# Patient Record
Sex: FEMALE | Race: ASIAN | NOT HISPANIC OR LATINO | Employment: STUDENT | URBAN - METROPOLITAN AREA
[De-identification: names, ages, dates, MRNs, and addresses within clinical notes are randomized per-mention and may not be internally consistent; named-entity substitution may affect disease eponyms.]

---

## 2021-09-20 ENCOUNTER — MEDICAL CORRESPONDENCE (OUTPATIENT)
Dept: HEALTH INFORMATION MANAGEMENT | Facility: CLINIC | Age: 31
End: 2021-09-20

## 2022-09-01 ENCOUNTER — LAB (OUTPATIENT)
Dept: LAB | Facility: CLINIC | Age: 32
End: 2022-09-01
Payer: MEDICAID

## 2022-09-01 DIAGNOSIS — Z02.89 REFUGEE HEALTH EXAMINATION: ICD-10-CM

## 2022-09-01 LAB
ALBUMIN SERPL BCG-MCNC: 4.3 G/DL (ref 3.5–5.2)
ALP SERPL-CCNC: 61 U/L (ref 35–104)
ALT SERPL W P-5'-P-CCNC: 28 U/L (ref 10–35)
ANION GAP SERPL CALCULATED.3IONS-SCNC: 14 MMOL/L (ref 7–15)
AST SERPL W P-5'-P-CCNC: 38 U/L (ref 10–35)
BASOPHILS # BLD AUTO: 0.1 10E3/UL (ref 0–0.2)
BASOPHILS NFR BLD AUTO: 2 %
BILIRUB SERPL-MCNC: 0.3 MG/DL
BUN SERPL-MCNC: 8.6 MG/DL (ref 6–20)
CALCIUM SERPL-MCNC: 9.7 MG/DL (ref 8.6–10)
CHLORIDE SERPL-SCNC: 102 MMOL/L (ref 98–107)
CHOLEST SERPL-MCNC: 195 MG/DL
CREAT SERPL-MCNC: 0.71 MG/DL (ref 0.51–0.95)
DEPRECATED HCO3 PLAS-SCNC: 21 MMOL/L (ref 22–29)
EOSINOPHIL # BLD AUTO: 0.6 10E3/UL (ref 0–0.7)
EOSINOPHIL NFR BLD AUTO: 6 %
ERYTHROCYTE [DISTWIDTH] IN BLOOD BY AUTOMATED COUNT: 12.1 % (ref 10–15)
GFR SERPL CREATININE-BSD FRML MDRD: >90 ML/MIN/1.73M2
GLUCOSE SERPL-MCNC: 77 MG/DL (ref 70–99)
HAV IGG SER QL IA: REACTIVE
HBV CORE AB SERPL QL IA: NONREACTIVE
HBV SURFACE AB SERPL IA-ACNC: 164.47 M[IU]/ML
HBV SURFACE AB SERPL IA-ACNC: REACTIVE M[IU]/ML
HBV SURFACE AG SERPL QL IA: NONREACTIVE
HCT VFR BLD AUTO: 42.3 % (ref 35–47)
HDLC SERPL-MCNC: 54 MG/DL
HGB BLD-MCNC: 14.1 G/DL (ref 11.7–15.7)
IMM GRANULOCYTES # BLD: 0 10E3/UL
IMM GRANULOCYTES NFR BLD: 0 %
LDLC SERPL CALC-MCNC: 127 MG/DL
LYMPHOCYTES # BLD AUTO: 3.5 10E3/UL (ref 0.8–5.3)
LYMPHOCYTES NFR BLD AUTO: 39 %
MCH RBC QN AUTO: 30.4 PG (ref 26.5–33)
MCHC RBC AUTO-ENTMCNC: 33.3 G/DL (ref 31.5–36.5)
MCV RBC AUTO: 91 FL (ref 78–100)
MONOCYTES # BLD AUTO: 0.7 10E3/UL (ref 0–1.3)
MONOCYTES NFR BLD AUTO: 8 %
NEUTROPHILS # BLD AUTO: 4 10E3/UL (ref 1.6–8.3)
NEUTROPHILS NFR BLD AUTO: 45 %
NONHDLC SERPL-MCNC: 141 MG/DL
NRBC # BLD AUTO: 0 10E3/UL
NRBC BLD AUTO-RTO: 0 /100
PLATELET # BLD AUTO: 243 10E3/UL (ref 150–450)
POTASSIUM SERPL-SCNC: 4.1 MMOL/L (ref 3.4–5.3)
PROT SERPL-MCNC: 8 G/DL (ref 6.4–8.3)
RBC # BLD AUTO: 4.64 10E6/UL (ref 3.8–5.2)
SODIUM SERPL-SCNC: 137 MMOL/L (ref 136–145)
T PALLIDUM AB SER QL: NONREACTIVE
TRIGL SERPL-MCNC: 68 MG/DL
WBC # BLD AUTO: 8.9 10E3/UL (ref 4–11)

## 2022-09-01 PROCEDURE — 86704 HEP B CORE ANTIBODY TOTAL: CPT

## 2022-09-01 PROCEDURE — 85025 COMPLETE CBC W/AUTO DIFF WBC: CPT

## 2022-09-01 PROCEDURE — 99000 SPECIMEN HANDLING OFFICE-LAB: CPT

## 2022-09-01 PROCEDURE — 87389 HIV-1 AG W/HIV-1&-2 AB AG IA: CPT

## 2022-09-01 PROCEDURE — 86787 VARICELLA-ZOSTER ANTIBODY: CPT

## 2022-09-01 PROCEDURE — 86708 HEPATITIS A ANTIBODY: CPT

## 2022-09-01 PROCEDURE — 36415 COLL VENOUS BLD VENIPUNCTURE: CPT

## 2022-09-01 PROCEDURE — 80061 LIPID PANEL: CPT

## 2022-09-01 PROCEDURE — 86682 HELMINTH ANTIBODY: CPT | Mod: 90

## 2022-09-01 PROCEDURE — 80053 COMPREHEN METABOLIC PANEL: CPT

## 2022-09-01 PROCEDURE — 87340 HEPATITIS B SURFACE AG IA: CPT

## 2022-09-01 PROCEDURE — 86803 HEPATITIS C AB TEST: CPT

## 2022-09-01 PROCEDURE — 86780 TREPONEMA PALLIDUM: CPT

## 2022-09-01 PROCEDURE — 86706 HEP B SURFACE ANTIBODY: CPT

## 2022-09-01 PROCEDURE — 86481 TB AG RESPONSE T-CELL SUSP: CPT

## 2022-09-02 LAB
HCV AB SERPL QL IA: NONREACTIVE
HIV 1+2 AB+HIV1 P24 AG SERPL QL IA: NONREACTIVE
VZV IGG SER QL IA: 879.8 INDEX
VZV IGG SER QL IA: POSITIVE

## 2022-09-03 LAB
GAMMA INTERFERON BACKGROUND BLD IA-ACNC: 0.19 IU/ML
M TB IFN-G BLD-IMP: NEGATIVE
M TB IFN-G CD4+ BCKGRND COR BLD-ACNC: 4.38 IU/ML
MITOGEN IGNF BCKGRD COR BLD-ACNC: -0.06 IU/ML
MITOGEN IGNF BCKGRD COR BLD-ACNC: -0.06 IU/ML
QUANTIFERON MITOGEN: 4.57 IU/ML
QUANTIFERON NIL TUBE: 0.19 IU/ML
QUANTIFERON TB1 TUBE: 0.13 IU/ML
QUANTIFERON TB2 TUBE: 0.13
STRONGYLOIDES IGG SER IA-ACNC: 0.5 IV

## 2022-09-07 ENCOUNTER — OFFICE VISIT (OUTPATIENT)
Dept: FAMILY MEDICINE | Facility: CLINIC | Age: 32
End: 2022-09-07
Payer: MEDICAID

## 2022-09-07 VITALS
HEART RATE: 81 BPM | WEIGHT: 118.5 LBS | BODY MASS INDEX: 23.26 KG/M2 | HEIGHT: 60 IN | DIASTOLIC BLOOD PRESSURE: 60 MMHG | TEMPERATURE: 98.1 F | SYSTOLIC BLOOD PRESSURE: 110 MMHG | RESPIRATION RATE: 16 BRPM | OXYGEN SATURATION: 99 %

## 2022-09-07 DIAGNOSIS — F34.1 DYSTHYMIA: ICD-10-CM

## 2022-09-07 DIAGNOSIS — I10 ESSENTIAL HYPERTENSION: ICD-10-CM

## 2022-09-07 DIAGNOSIS — Z02.89 REFUGEE HEALTH EXAMINATION: Primary | ICD-10-CM

## 2022-09-07 DIAGNOSIS — G44.209 TENSION HEADACHE: ICD-10-CM

## 2022-09-07 DIAGNOSIS — Z87.59 H/O ECLAMPSIA: ICD-10-CM

## 2022-09-07 DIAGNOSIS — B65.9 SCHISTOSOMIASIS: ICD-10-CM

## 2022-09-07 DIAGNOSIS — Z30.40 SURVEILLANCE OF CONTRACEPTIVE IMPLANT: ICD-10-CM

## 2022-09-07 DIAGNOSIS — F10.20 ALCOHOLISM (H): ICD-10-CM

## 2022-09-07 LAB — SCHISTOSOMA IGG SER IA-ACNC: 12 U

## 2022-09-07 PROCEDURE — 99417 PROLNG OP E/M EACH 15 MIN: CPT | Performed by: FAMILY MEDICINE

## 2022-09-07 PROCEDURE — 90686 IIV4 VACC NO PRSV 0.5 ML IM: CPT | Performed by: FAMILY MEDICINE

## 2022-09-07 PROCEDURE — 0052A COVID-19,PF,PFIZER (12+ YRS): CPT | Performed by: FAMILY MEDICINE

## 2022-09-07 PROCEDURE — 90472 IMMUNIZATION ADMIN EACH ADD: CPT | Performed by: FAMILY MEDICINE

## 2022-09-07 PROCEDURE — 90471 IMMUNIZATION ADMIN: CPT | Performed by: FAMILY MEDICINE

## 2022-09-07 PROCEDURE — 90715 TDAP VACCINE 7 YRS/> IM: CPT | Performed by: FAMILY MEDICINE

## 2022-09-07 PROCEDURE — 91305 COVID-19,PF,PFIZER (12+ YRS): CPT | Performed by: FAMILY MEDICINE

## 2022-09-07 PROCEDURE — 99205 OFFICE O/P NEW HI 60 MIN: CPT | Mod: 25 | Performed by: FAMILY MEDICINE
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
## 2022-09-08 PROBLEM — F34.1 DYSTHYMIA: Status: ACTIVE | Noted: 2022-09-08

## 2022-09-08 RX ORDER — ACETAMINOPHEN 500 MG
500-1000 TABLET ORAL EVERY 6 HOURS PRN
Qty: 50 TABLET | Refills: 4 | Status: SHIPPED | OUTPATIENT
Start: 2022-09-08 | End: 2023-11-01

## 2022-09-08 RX ORDER — PRAZIQUANTEL 600 MG/1
1200 TABLET, FILM COATED ORAL 2 TIMES DAILY
Qty: 4 TABLET | Refills: 0 | Status: SHIPPED | OUTPATIENT
Start: 2022-09-08 | End: 2022-09-21

## 2022-09-21 ENCOUNTER — TELEPHONE (OUTPATIENT)
Dept: FAMILY MEDICINE | Facility: CLINIC | Age: 32
End: 2022-09-21

## 2022-09-21 DIAGNOSIS — Z76.0 ENCOUNTER FOR MEDICATION REFILL: Primary | ICD-10-CM

## 2022-09-21 DIAGNOSIS — B65.9 SCHISTOSOMIASIS: ICD-10-CM

## 2022-09-21 NOTE — TELEPHONE ENCOUNTER
Medication Question or Refill    Contacts       Type Contact Phone/Fax    09/21/2022 09:50 AM CDT Phone (Incoming) Chris  (Other) [phone]          What medication are you calling about (include dose and sig)?: praziquantel (BILTRICIDE) 600 MG tablet    Controlled Substance Agreement on file:   CSA -- Patient Level:    CSA: None found at the patient level.       Who prescribed the medication?: Dr. Schafer    Do you need a refill? Yes: [facility] states never receive prescription. E-Prescribing Status: Receipt confirmed by pharmacy (9/8/2022  8:22 PM CDT).    When did you use the medication last? N/A    Patient offered an appointment? No    Do you have any questions or concerns?  Yes: Chris, the  call to report of [facility] stated [facility] did not receive patient prescription. Please resend prescription to [facility] - [place], [place] - [address] [facility]. Please contact the patient to let patient know when prescription is resend to pharmacy.    Preferred Pharmacy:     [facility] - [place], [place] - [address].  [address] [place] [address].  [place] [address]  Phone: [phone] Fax: [phone]      Okay to leave a detailed message?: Yes at Cell number on file:    Telephone Information:   Mobile [phone]

## 2022-09-21 NOTE — TELEPHONE ENCOUNTER
Reviewed and [facility] MD please sign, if this re-order is not successful, MA will contact pharmacy to phone in drug order. Thanks!

## 2022-09-22 RX ORDER — PRAZIQUANTEL 600 MG/1
1200 TABLET, FILM COATED ORAL 2 TIMES DAILY
Qty: 4 TABLET | Refills: 0 | Status: SHIPPED | OUTPATIENT
Start: 2022-09-22 | End: 2023-05-02

## 2022-09-22 NOTE — TELEPHONE ENCOUNTER
I signed the script for the medication, but changed the pharmacy to [facility] as instructed.

## 2023-02-01 ENCOUNTER — OFFICE VISIT (OUTPATIENT)
Dept: FAMILY MEDICINE | Facility: CLINIC | Age: 33
End: 2023-02-01
Payer: COMMERCIAL

## 2023-02-01 VITALS
HEART RATE: 95 BPM | BODY MASS INDEX: 25.52 KG/M2 | DIASTOLIC BLOOD PRESSURE: 82 MMHG | HEIGHT: 60 IN | OXYGEN SATURATION: 99 % | TEMPERATURE: 98 F | WEIGHT: 130 LBS | RESPIRATION RATE: 16 BRPM | SYSTOLIC BLOOD PRESSURE: 136 MMHG

## 2023-02-01 DIAGNOSIS — Z23 NEED FOR VACCINATION: ICD-10-CM

## 2023-02-01 DIAGNOSIS — Z12.4 CERVICAL CANCER SCREENING: Primary | ICD-10-CM

## 2023-02-01 DIAGNOSIS — F10.20 ALCOHOLISM (H): ICD-10-CM

## 2023-02-01 LAB
CLUE CELLS: NORMAL
TRICHOMONAS, WET PREP: NORMAL
WBC'S/HIGH POWER FIELD, WET PREP: NORMAL
YEAST, WET PREP: NORMAL

## 2023-02-01 PROCEDURE — 87624 HPV HI-RISK TYP POOLED RSLT: CPT | Performed by: FAMILY MEDICINE

## 2023-02-01 PROCEDURE — 87491 CHLMYD TRACH DNA AMP PROBE: CPT | Performed by: FAMILY MEDICINE

## 2023-02-01 PROCEDURE — 87210 SMEAR WET MOUNT SALINE/INK: CPT | Performed by: FAMILY MEDICINE

## 2023-02-01 PROCEDURE — 99215 OFFICE O/P EST HI 40 MIN: CPT | Performed by: FAMILY MEDICINE

## 2023-02-01 PROCEDURE — G0145 SCR C/V CYTO,THINLAYER,RESCR: HCPCS | Performed by: FAMILY MEDICINE

## 2023-02-01 PROCEDURE — 87591 N.GONORRHOEAE DNA AMP PROB: CPT | Performed by: FAMILY MEDICINE

## 2023-02-01 ASSESSMENT — PATIENT HEALTH QUESTIONNAIRE - PHQ9
10. IF YOU CHECKED OFF ANY PROBLEMS, HOW DIFFICULT HAVE THESE PROBLEMS MADE IT FOR YOU TO DO YOUR WORK, TAKE CARE OF THINGS AT HOME, OR GET ALONG WITH OTHER PEOPLE: NOT DIFFICULT AT ALL
SUM OF ALL RESPONSES TO PHQ QUESTIONS 1-9: 0
SUM OF ALL RESPONSES TO PHQ QUESTIONS 1-9: 0
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
## 2023-02-02 PROBLEM — I10 ESSENTIAL (PRIMARY) HYPERTENSION: Status: ACTIVE | Noted: 2022-09-07

## 2023-02-02 LAB
C TRACH DNA SPEC QL NAA+PROBE: NEGATIVE
N GONORRHOEA DNA SPEC QL NAA+PROBE: NEGATIVE

## 2023-02-03 LAB
BKR LAB AP GYN ADEQUACY: NORMAL
BKR LAB AP GYN INTERPRETATION: NORMAL
BKR LAB AP HPV REFLEX: NORMAL
BKR LAB AP PREVIOUS ABNORMAL: NORMAL
PATH REPORT.COMMENTS IMP SPEC: NORMAL
PATH REPORT.COMMENTS IMP SPEC: NORMAL
PATH REPORT.RELEVANT HX SPEC: NORMAL

## 2023-02-07 LAB
HUMAN PAPILLOMA VIRUS 16 DNA: NEGATIVE
HUMAN PAPILLOMA VIRUS 18 DNA: NEGATIVE
HUMAN PAPILLOMA VIRUS FINAL DIAGNOSIS: NORMAL
HUMAN PAPILLOMA VIRUS OTHER HR: NEGATIVE

## 2023-05-02 ENCOUNTER — OFFICE VISIT (OUTPATIENT)
Dept: FAMILY MEDICINE | Facility: CLINIC | Age: 33
End: 2023-05-02
Payer: COMMERCIAL

## 2023-05-02 VITALS
BODY MASS INDEX: 25.91 KG/M2 | OXYGEN SATURATION: 100 % | HEIGHT: 60 IN | HEART RATE: 95 BPM | DIASTOLIC BLOOD PRESSURE: 92 MMHG | TEMPERATURE: 98.3 F | WEIGHT: 132 LBS | SYSTOLIC BLOOD PRESSURE: 134 MMHG

## 2023-05-02 DIAGNOSIS — F34.1 DYSTHYMIA: ICD-10-CM

## 2023-05-02 DIAGNOSIS — F51.04 PSYCHOPHYSIOLOGIC INSOMNIA: ICD-10-CM

## 2023-05-02 DIAGNOSIS — F10.20 ALCOHOLISM (H): ICD-10-CM

## 2023-05-02 DIAGNOSIS — F10.20 ALCOHOLISM (H): Primary | ICD-10-CM

## 2023-05-02 DIAGNOSIS — R10.10 PAIN OF UPPER ABDOMEN: Primary | ICD-10-CM

## 2023-05-02 DIAGNOSIS — I10 ESSENTIAL HYPERTENSION: ICD-10-CM

## 2023-05-02 DIAGNOSIS — H54.7 VISION PROBLEMS: ICD-10-CM

## 2023-05-02 LAB
ALBUMIN SERPL BCG-MCNC: 4.8 G/DL (ref 3.5–5.2)
ALBUMIN UR-MCNC: NEGATIVE MG/DL
ALP SERPL-CCNC: 73 U/L (ref 35–104)
ALT SERPL W P-5'-P-CCNC: 25 U/L (ref 10–35)
ANION GAP SERPL CALCULATED.3IONS-SCNC: 12 MMOL/L (ref 7–15)
APPEARANCE UR: CLEAR
AST SERPL W P-5'-P-CCNC: 25 U/L (ref 10–35)
BASOPHILS # BLD AUTO: 0.1 10E3/UL (ref 0–0.2)
BASOPHILS NFR BLD AUTO: 1 %
BILIRUB SERPL-MCNC: 0.2 MG/DL
BILIRUB UR QL STRIP: NEGATIVE
BUN SERPL-MCNC: 9.1 MG/DL (ref 6–20)
CALCIUM SERPL-MCNC: 10.3 MG/DL (ref 8.6–10)
CHLORIDE SERPL-SCNC: 101 MMOL/L (ref 98–107)
COLOR UR AUTO: YELLOW
CREAT SERPL-MCNC: 0.81 MG/DL (ref 0.51–0.95)
DEPRECATED HCO3 PLAS-SCNC: 25 MMOL/L (ref 22–29)
EOSINOPHIL # BLD AUTO: 0.3 10E3/UL (ref 0–0.7)
EOSINOPHIL NFR BLD AUTO: 5 %
ERYTHROCYTE [DISTWIDTH] IN BLOOD BY AUTOMATED COUNT: 12.7 % (ref 10–15)
GFR SERPL CREATININE-BSD FRML MDRD: >90 ML/MIN/1.73M2
GLUCOSE SERPL-MCNC: 95 MG/DL (ref 70–99)
GLUCOSE UR STRIP-MCNC: NEGATIVE MG/DL
HCT VFR BLD AUTO: 45.1 % (ref 35–47)
HGB BLD-MCNC: 14.4 G/DL (ref 11.7–15.7)
HGB UR QL STRIP: NEGATIVE
IMM GRANULOCYTES # BLD: 0 10E3/UL
IMM GRANULOCYTES NFR BLD: 0 %
KETONES UR STRIP-MCNC: NEGATIVE MG/DL
LEUKOCYTE ESTERASE UR QL STRIP: NEGATIVE
LYMPHOCYTES # BLD AUTO: 3.3 10E3/UL (ref 0.8–5.3)
LYMPHOCYTES NFR BLD AUTO: 45 %
MCH RBC QN AUTO: 28 PG (ref 26.5–33)
MCHC RBC AUTO-ENTMCNC: 31.9 G/DL (ref 31.5–36.5)
MCV RBC AUTO: 88 FL (ref 78–100)
MONOCYTES # BLD AUTO: 0.4 10E3/UL (ref 0–1.3)
MONOCYTES NFR BLD AUTO: 6 %
NEUTROPHILS # BLD AUTO: 3.1 10E3/UL (ref 1.6–8.3)
NEUTROPHILS NFR BLD AUTO: 43 %
NITRATE UR QL: NEGATIVE
PH UR STRIP: 7 [PH] (ref 5–7)
PLATELET # BLD AUTO: 258 10E3/UL (ref 150–450)
POTASSIUM SERPL-SCNC: 4.2 MMOL/L (ref 3.4–5.3)
PROT SERPL-MCNC: 8.4 G/DL (ref 6.4–8.3)
RBC # BLD AUTO: 5.14 10E6/UL (ref 3.8–5.2)
SODIUM SERPL-SCNC: 138 MMOL/L (ref 136–145)
SP GR UR STRIP: 1.02 (ref 1–1.03)
TSH SERPL DL<=0.005 MIU/L-ACNC: 2.29 UIU/ML (ref 0.3–4.2)
UROBILINOGEN UR STRIP-ACNC: 0.2 E.U./DL
WBC # BLD AUTO: 7.2 10E3/UL (ref 4–11)

## 2023-05-02 PROCEDURE — 99214 OFFICE O/P EST MOD 30 MIN: CPT | Performed by: FAMILY MEDICINE

## 2023-05-02 PROCEDURE — 36415 COLL VENOUS BLD VENIPUNCTURE: CPT | Performed by: FAMILY MEDICINE

## 2023-05-02 PROCEDURE — 80050 GENERAL HEALTH PANEL: CPT | Performed by: FAMILY MEDICINE

## 2023-05-02 PROCEDURE — 81003 URINALYSIS AUTO W/O SCOPE: CPT | Performed by: FAMILY MEDICINE

## 2023-05-02 RX ORDER — TRAZODONE HYDROCHLORIDE 50 MG/1
50-100 TABLET, FILM COATED ORAL AT BEDTIME
Qty: 60 TABLET | Refills: 11 | Status: SHIPPED | OUTPATIENT
Start: 2023-05-02 | End: 2023-11-01

## 2023-05-02 RX ORDER — FAMOTIDINE 40 MG/1
40 TABLET, FILM COATED ORAL 2 TIMES DAILY PRN
Qty: 90 TABLET | Refills: 3 | Status: SHIPPED | OUTPATIENT
Start: 2023-05-02 | End: 2023-11-01
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
## 2023-05-03 ENCOUNTER — TELEPHONE (OUTPATIENT)
Dept: FAMILY MEDICINE | Facility: CLINIC | Age: 33
End: 2023-05-03
Payer: COMMERCIAL

## 2023-11-01 ENCOUNTER — OFFICE VISIT (OUTPATIENT)
Dept: FAMILY MEDICINE | Facility: CLINIC | Age: 33
End: 2023-11-01
Payer: COMMERCIAL

## 2023-11-01 VITALS
HEIGHT: 60 IN | RESPIRATION RATE: 14 BRPM | TEMPERATURE: 100 F | SYSTOLIC BLOOD PRESSURE: 134 MMHG | DIASTOLIC BLOOD PRESSURE: 87 MMHG | WEIGHT: 142 LBS | BODY MASS INDEX: 27.88 KG/M2 | OXYGEN SATURATION: 99 % | HEART RATE: 71 BPM

## 2023-11-01 DIAGNOSIS — I10 ESSENTIAL (PRIMARY) HYPERTENSION: Primary | ICD-10-CM

## 2023-11-01 DIAGNOSIS — G47.00 INSOMNIA, UNSPECIFIED TYPE: ICD-10-CM

## 2023-11-01 DIAGNOSIS — Z28.39 IMMUNIZATION DEFICIENCY: ICD-10-CM

## 2023-11-01 PROCEDURE — 90686 IIV4 VACC NO PRSV 0.5 ML IM: CPT | Performed by: FAMILY MEDICINE

## 2023-11-01 PROCEDURE — 91320 SARSCV2 VAC 30MCG TRS-SUC IM: CPT | Performed by: FAMILY MEDICINE

## 2023-11-01 PROCEDURE — 99213 OFFICE O/P EST LOW 20 MIN: CPT | Mod: 25 | Performed by: FAMILY MEDICINE

## 2023-11-01 PROCEDURE — 90471 IMMUNIZATION ADMIN: CPT | Performed by: FAMILY MEDICINE

## 2023-11-01 PROCEDURE — 90480 ADMN SARSCOV2 VAC 1/ONLY CMP: CPT | Performed by: FAMILY MEDICINE

## 2023-11-01 ASSESSMENT — PATIENT HEALTH QUESTIONNAIRE - PHQ9
SUM OF ALL RESPONSES TO PHQ QUESTIONS 1-9: 0
SUM OF ALL RESPONSES TO PHQ QUESTIONS 1-9: 0

## 2023-11-01 NOTE — COMMUNITY RESOURCES LIST (ENGLISH)
11/01/2023   [facility] - Outpatient Clinics  N/A  For additional resource needs, please contact your health insurance member services or your primary care team.  Phone: [phone]   Email: N/A   Address: [address] [place] [id number]   Hours: N/A        Transportation       Free or low-cost transportation  1  [employer] Distance: 4.79 miles      In-Person   [address] [place] [address]  Language: English, Vietnamese  Hours: Mon 9:00 AM - 5:00 PM , Tue 9:30 AM - 7:00 PM , Wed 9:00 AM - 5:00 PM , Thu 9:30 AM - 7:00 PM , Fri 9:00 AM - 5:00 PM  Fees: Free   Phone: (371) 960-5146 Email: contactus@[employer] Website: http://www.[employer]     2  [facility] - Free Bus and Gas Cards - Free or low-cost transportation Distance: 5.43 miles      [facility]   [address] [place] [address]  Language: English  Hours: Mon - Fri 9:00 AM - 4:00 PM , Sun 9:30 AM - 12:00 PM  Fees: Free   Phone: (426) 983-2920 Email: [email or web address] [email or web address] Website: http://[facility].org/[facility]/[place]/     Transportation to medical appointments  3  Discover Ride Distance: 1.6 miles      In-Person   [address] [address]  Language: English  Hours: Mon - Thu 6:00 AM - 6:00 PM , Fri 6:00 AM - 5:00 PM  Fees: Insurance, Self Pay   Phone: (828) 812-6688 Email: office@[employer] Website: https://www.[employer]/     4  [facility] - Non-Emergency Medical Transportation Distance: 3.24 miles      In-Person   [address] [place] [address]  Language: English  Hours: Mon - Fri 8:00 AM - 4:00 PM Appt. Only  Fees: Self Pay   Phone: (879) 114-7847 Website: http://www.[facility].org/Medical-Services/Emergency-medical-services/Non-emergency-transportation/          Important Numbers & Websites       [facility]   [address].org  Poison Control   (812) 962-2259 [facility].org  Suicide and Crisis  Lifeline   988 988lifeline.org  Childhelp [place] Child Abuse Hotline   879.129.2056 Childhelphotline.org  National Sexual Assault Hotline   (972) 152-6712 (HOPE) Rainn.org  [place] Runaway Safeline   (580) 485-9585 (RUNAWAY) 1800runaway.org  Pregnancy & Postpartum Support [place]   Call/text 781-580-2114 Ppsupport[place].org  Substance Abuse National Helpline ([facility]   239-636-HELP (1091) Findtreatment.gov  Emergency Services   910

## 2023-11-01 NOTE — PROGRESS NOTES
Answers submitted by the patient for this visit:  Patient Health Questionnaire (Submitted on 11/1/2023)  PHQ9 TOTAL SCORE: 0    ASSESMENT AND PLAN:  Diagnoses and all orders for this visit:  Essential (primary) hypertension  Today's reading shows good control off medication.  Regular exercise, healthy eating, follow-up for follow-up with PCP.  Insomnia, unspecified type  Stable, under good control without medication currently, I removed trazodone from her medication list.  Immunization deficiency  -     COVID-19 12+ (2023-24) (PFIZER)  -     INFLUENZA VACCINE >6 MONTHS (AFLURIA/FLUZONE)  Green Card/update imm.  Counseling done on immunization history and requirements with the patient.  Reviewed available immunization history and relevant lab records with patient and family.  Immunizations given as documented in the EHR and on form.  Acetaminophen as needed for post-immunization fever or myalgias.  [employer] form I-693 completed today with the patient.  Given to patient in a sealed labeled envelope and a copy is being scanned into the EHR.  Please see that form for further details.  Patient directed to follow-up in clinic for routine and preventative care.         Reviewed the risks and benefits of the treatment plan with the patient and/or caregiver and we discussed indications for routine and emergent follow-up.        SUBJECTIVE: Patient has a history of insomnia, had been on trazodone in the past but reports she has not been taking that medication for a few months.  She reports that she is sleeping much better and does not need the medication anymore.  Past history of hypertension, currently not on blood pressure medication.  She is noted to have a low-grade temperature elevation today but denies feeling feverish.  No cough or congestion or sore throat or difficulty with her breathing.  No past history of immunization reactions or problems.    Past Medical History:   Diagnosis Date  "   Eclampsia     H/O alcohol abuse     refugee papers say over 10 years; she denies it was this long and states it was due to spouse drinking     Patient Active Problem List   Diagnosis    Essential (primary) hypertension    Alcoholism (H)    Surveillance of contraceptive implant    Dysthymia     No current outpatient medications on file.     History   Smoking Status    Never   Smokeless Tobacco    Current    Types: Chew       OBJECTICE: /87   Pulse 71   Temp 100  F (37.8  C) (Oral)   Resp 14   Ht 1.52 m (4' 11.84\")   Wt 64.4 kg (142 lb)   LMP 09/25/2023 (Exact Date)   SpO2 99%   BMI 27.88 kg/m       No results found for this or any previous visit (from the past 24 hour(s)).     CV-regular rate and rhythm with no murmur   RESP-lungs clear to auscultation     Wing Ruano MD   "

## 2024-01-04 ENCOUNTER — NURSE TRIAGE (OUTPATIENT)
Dept: NURSING | Facility: CLINIC | Age: 34
End: 2024-01-04
Payer: COMMERCIAL

## 2024-01-04 NOTE — TELEPHONE ENCOUNTER
Call with interpretor.  Patient, even with the interpretor's assistance was not stephany to tell me her reason for the call. She could not answer my questions.  Cheikh Gill's cousin then got on the phone to assist.    Cheikh Gill has a Nexplanon in place. Cheikh Gill and her cousin do not know when this should be replaced.    Cheikh Gill's cousin declined my offer to send a message to Cheikh Gill's provider for an answer.  I did tell them that there could be a concern with the Nexplanon's effectiveness if it is past the recommended replacement date. If it is past the recommended replacement date, and patient does not want to become pregnant another form of birth control will need to be used.  The cousin stated understanding and stated she will give this information to Cheikh Gill. I did find two upcoming appt for Cheikh Gill. 1/16/24 with Dr Schafer and one on 2/7/24 with Dr Ware, both at [facility]. Cheikh Gill stated acknowledgment of both appts.  No further questions.  I asked that Cheikh Gill call back with further questions and concerns.    Georgia HOSKINS RN [facility]

## 2024-01-16 ENCOUNTER — OFFICE VISIT (OUTPATIENT)
Dept: FAMILY MEDICINE | Facility: CLINIC | Age: 34
End: 2024-01-16
Attending: FAMILY MEDICINE
Payer: COMMERCIAL

## 2024-01-16 VITALS
OXYGEN SATURATION: 99 % | SYSTOLIC BLOOD PRESSURE: 130 MMHG | TEMPERATURE: 99.5 F | HEART RATE: 87 BPM | WEIGHT: 139.56 LBS | BODY MASS INDEX: 27.4 KG/M2 | HEIGHT: 60 IN | RESPIRATION RATE: 20 BRPM | DIASTOLIC BLOOD PRESSURE: 91 MMHG

## 2024-01-16 DIAGNOSIS — F51.04 PSYCHOPHYSIOLOGIC INSOMNIA: ICD-10-CM

## 2024-01-16 DIAGNOSIS — F34.1 DYSTHYMIA: ICD-10-CM

## 2024-01-16 DIAGNOSIS — Z00.00 PREVENTATIVE HEALTH CARE: Primary | ICD-10-CM

## 2024-01-16 DIAGNOSIS — H54.7 VISION PROBLEMS: ICD-10-CM

## 2024-01-16 DIAGNOSIS — R42 DIZZINESS: ICD-10-CM

## 2024-01-16 DIAGNOSIS — I10 ESSENTIAL HYPERTENSION: ICD-10-CM

## 2024-01-16 DIAGNOSIS — R10.10 PAIN OF UPPER ABDOMEN: ICD-10-CM

## 2024-01-16 DIAGNOSIS — Z30.41 SURVEILLANCE OF PREVIOUSLY PRESCRIBED CONTRACEPTIVE PILL: ICD-10-CM

## 2024-01-16 DIAGNOSIS — F10.20 ALCOHOLISM (H): ICD-10-CM

## 2024-01-16 DIAGNOSIS — Z23 NEED FOR VACCINATION: ICD-10-CM

## 2024-01-16 LAB — HCG UR QL: NEGATIVE

## 2024-01-16 PROCEDURE — 11982 REMOVE DRUG IMPLANT DEVICE: CPT | Performed by: FAMILY MEDICINE

## 2024-01-16 PROCEDURE — 90471 IMMUNIZATION ADMIN: CPT | Performed by: FAMILY MEDICINE

## 2024-01-16 PROCEDURE — 90677 PCV20 VACCINE IM: CPT | Performed by: FAMILY MEDICINE

## 2024-01-16 PROCEDURE — 99395 PREV VISIT EST AGE 18-39: CPT | Mod: 25 | Performed by: FAMILY MEDICINE

## 2024-01-16 PROCEDURE — 81025 URINE PREGNANCY TEST: CPT | Performed by: FAMILY MEDICINE

## 2024-01-16 PROCEDURE — 99213 OFFICE O/P EST LOW 20 MIN: CPT | Mod: 25 | Performed by: FAMILY MEDICINE

## 2024-01-16 RX ORDER — ACETAMINOPHEN 500 MG
500-1000 TABLET ORAL EVERY 6 HOURS PRN
Qty: 50 TABLET | Refills: 4 | Status: SHIPPED | OUTPATIENT
Start: 2024-01-16

## 2024-01-16 RX ORDER — DESOGESTREL AND ETHINYL ESTRADIOL 0.15-0.03
1 KIT ORAL DAILY
Qty: 28 TABLET | Refills: 11 | Status: SHIPPED | OUTPATIENT
Start: 2024-01-16 | End: 2024-07-16

## 2024-01-16 ASSESSMENT — ENCOUNTER SYMPTOMS
HEARTBURN: 0
ARTHRALGIAS: 0
COUGH: 0
DIARRHEA: 0
BREAST MASS: 0
NERVOUS/ANXIOUS: 0
FREQUENCY: 0
SORE THROAT: 0
WEAKNESS: 0
EYE PAIN: 0
CHILLS: 0
MYALGIAS: 0
FEVER: 0
HEMATURIA: 0
ABDOMINAL PAIN: 0
DIZZINESS: 1
HEADACHES: 0
NAUSEA: 0
HEMATOCHEZIA: 0
DYSURIA: 0
CONSTIPATION: 0
PALPITATIONS: 0
JOINT SWELLING: 0
PARESTHESIAS: 0
SHORTNESS OF BREATH: 0

## 2024-01-16 NOTE — PROGRESS NOTES
SUBJECTIVE:   Cheikh Gill is a 33 year old, presenting for the following:  Physical and Contraception        2024     8:44 AM   Additional Questions   Roomed by Bipin BRAVO       Healthy Habits:     Getting at least 3 servings of Calcium per day:  Yes    Bi-annual eye exam:  NO    Dental care twice a year:  NO    Sleep apnea or symptoms of sleep apnea:  None    Diet:  Regular (no restrictions)    Frequency of exercise:  2-3 days/week    Duration of exercise:  N/A    Taking medications regularly:  Yes    Medication side effects:  Not applicable    Additional concerns today:  No  Contraception  Pertinent negatives include no abdominal pain, arthralgias, chest pain, chills, congestion, coughing, fever, headaches, joint swelling, myalgias, nausea, rash, sore throat or weakness.     I want to remove the needle - the one placed in the camp 21. Paper says it expires 24, so it has .   At this time, I want to use the pill. I used it before. I do not want a pregnancy.  Urine pregnancy negative    No other issues.  Living with . He is working. When he comes home from work he is nice to me.    No problems with urination or vaginal discharge.    Dizziness comes at times. She thinks she drinks a lot of water. She finds the dizziness comes worst when she is cooking and smelling lots of aromas.     Have you ever done Advance Care Planning? (For example, a Health Directive, POLST, or a discussion with a medical provider or your loved ones about your wishes): No, advance care planning information given to patient to review.  Patient plans to discuss their wishes with loved ones or provider.      Social History     Tobacco Use    Smoking status: Never     Passive exposure: Never    Smokeless tobacco: Current     Types: Chew    Tobacco comments:     Betel nut with tabacco   Substance Use Topics    Alcohol use: Never             2024     8:43 AM   Alcohol Use   Prescreen: >3 drinks/day or >7 drinks/week? No      Reviewed orders with patient.  Reviewed health maintenance and updated orders accordingly - Yes  Lab work is in process  Labs reviewed in EPIC  BP Readings from Last 3 Encounters:   24 (!) 130/91   23 134/87   23 (!) 134/92    Wt Readings from Last 3 Encounters:   24 63.3 kg (139 lb 9 oz)   23 64.4 kg (142 lb)   23 59.9 kg (132 lb)              Patient Active Problem List   Diagnosis    Essential (primary) hypertension    Alcoholism (H)    Surveillance of contraceptive implant    Dysthymia     Past Surgical History:   Procedure Laterality Date    NO PAST SURGERIES         Social History     Tobacco Use    Smoking status: Never     Passive exposure: Never    Smokeless tobacco: Current     Types: Chew    Tobacco comments:     Betel nut with tabacco   Substance Use Topics    Alcohol use: Never     Family History   Problem Relation Age of Onset    Hypertension Mother     Coronary Artery Disease Mother 56        not sure if CAD or CHF    Hypertension Father     GERD Father     No Known Problems Sister     No Known Problems Brother     No Known Problems Brother     No Known Problems Brother     No Known Problems Maternal Grandmother     No Known Problems Maternal Grandfather     No Known Problems Paternal Grandmother     No Known Problems Paternal Grandfather          Current Outpatient Medications   Medication Sig Dispense Refill    desogestrel-ethinyl estradiol (APRI) 0.15-30 MG-MCG tablet Take 1 tablet by mouth daily 28 tablet 11     No Known Allergies    Breast Cancer Screenin/16/2024     8:43 AM   Breast CA Risk Assessment (FHS-7)   Do you have a family history of breast, colon, or ovarian cancer? No / Unknown       Pertinent mammograms are reviewed under the imaging tab.    History of abnormal Pap smear: NO - age 30-65 PAP every 5 years with negative HPV co-testing recommended      Latest Ref Rng & Units 2023    10:07 AM   PAP / HPV   PAP  Negative for  Intraepithelial Lesion or Malignancy (NILM)    HPV 16 DNA Negative Negative    HPV 18 DNA Negative Negative    Other HR HPV Negative Negative      Reviewed and updated as needed this visit by clinical staff   Tobacco  Allergies  Meds              Reviewed and updated as needed this visit by Provider                   Review of Systems   Constitutional:  Negative for chills and fever.   HENT:  Negative for congestion, ear pain, hearing loss and sore throat.    Eyes:  Negative for pain and visual disturbance.   Respiratory:  Negative for cough and shortness of breath.    Cardiovascular:  Negative for chest pain, palpitations and peripheral edema.   Gastrointestinal:  Negative for abdominal pain, constipation, diarrhea, heartburn, hematochezia and nausea.   Breasts:  Negative for tenderness, breast mass and discharge.   Genitourinary:  Negative for dysuria, frequency, genital sores, hematuria, pelvic pain, urgency, vaginal bleeding and vaginal discharge.   Musculoskeletal:  Negative for arthralgias, joint swelling and myalgias.   Skin:  Negative for rash.   Neurological:  Positive for dizziness. Negative for weakness, headaches and paresthesias.   Psychiatric/Behavioral:  Negative for mood changes. The patient is not nervous/anxious.         OBJECTIVE:   BP (!) 130/91   Pulse 87   Temp 99.5  F (37.5  C) (Oral)   Resp 20   Ht 1.524 m (5')   Wt 63.3 kg (139 lb 9 oz)   SpO2 99%   BMI 27.26 kg/m    Physical Exam  GENERAL: healthy, alert, no distress, and over weight  NECK: no adenopathy, no asymmetry, masses, or scars and thyroid normal to palpation  RESP: lungs clear to auscultation - no rales, rhonchi or wheezes  CV: regular rate and rhythm, normal S1 S2, no S3 or S4, no murmur, click or rub, no peripheral edema and peripheral pulses strong  MS: no gross musculoskeletal defects noted, no edema  SKIN: no suspicious lesions or rashes  PSYCH: mentation appears normal, affect flat, fatigued, judgement and insight  intact, and appearance well groomed    PROCEDURE:  Patient's left arm was marked where the distal end of the implant was located. Iodine soap applied x 3. I injected 3.5cc of lidocaine with epi and let it sit x 5 min. Then I verified anesthesia. Using an 11blade scalpel, I made a 1/2cm incision. I reached in the the straight clamp, grabbed the implant and removed it. I showed the paitient the implant. Then I washed off some of the iodine soap with alcohol wipes.I applied benzoin on either side of the incision, let it dry, wiped off blood and then applied the steri strip to close the incision. I put a bandaid over that. Then I folded 3 guaze and set them on the surgical site and wrapped coban around her arm, creating a pressure bandage.   No complications.  She was told to leave the pressure bandage on for 24 hrs, and leave the steri strip on until it falls off itself.    Diagnostic Test Results:  Labs reviewed in Epic  Results for orders placed or performed in visit on 01/16/24 (from the past 24 hour(s))   HCG qualitative urine   Result Value Ref Range    hCG Urine Qualitative Negative Negative     Results for orders placed or performed in visit on 01/16/24   HCG qualitative urine     Status: Normal   Result Value Ref Range    hCG Urine Qualitative Negative Negative       ASSESSMENT/PLAN:   (Z00.00) Preventative health care  (primary encounter diagnosis)  Comment: here for preventative care but really wants implanon out.  Plan: REVIEW OF HEALTH MAINTENANCE PROTOCOL ORDERS    (Z30.41) Surveillance of previously prescribed contraceptive pill  Comment: wants to change to taking the birth control pill. She has taken it before. She believes she can be compliant. Implant was removed today. Return for nurse BP check in 6 weeks.  Plan: desogestrel-ethinyl estradiol (APRI) 0.15-30         MG-MCG tablet    (R42) Dizziness  Comment: this happens mostly with cooking and smells from cooking. I suggested she wear a mask.  Follow.  Plan: acetaminophen (TYLENOL) 500 MG tablet    (I10) Essential hypertension  Comment: not controlled today - recheck with nurse visit after starting OCPs. If not better then, might start medication    (F10.20) Alcoholism (H)  Comment: historical, however, chart notes an ER visit due to 's drinking. Follow/monitor closely.    (H54.7) Vision problems  Comment: re-referred    (R10.10) Pain of upper abdomen  Comment: resolved per patient    (F51.04) Psychophysiologic insomnia  Comment: not discussed today    (F34.1) Dysthymia  Comment: mood is ok, she is getting used to [place] the [place]    (Z23) Need for vaccination  Comment: given  Plan: Pneumococcal 20 Valent Conjugate (Prevnar 20)        COUNSELING:  Reviewed preventive health counseling, as reflected in patient instructions       Regular exercise       Healthy diet/nutrition       Vision screening       Immunizations  Vaccinated for: Pneumococcal and Declined: Covid-19 due to Other not said             Contraception       Advance Care Planning      BMI:   Estimated body mass index is 27.26 kg/m  as calculated from the following:    Height as of this encounter: 1.524 m (5').    Weight as of this encounter: 63.3 kg (139 lb 9 oz).         She reports that she has never smoked. She has never been exposed to tobacco smoke. Her smokeless tobacco use includes chew.      Angie Schafer MD  [facility]

## 2024-01-16 NOTE — PROGRESS NOTES
Prior to immunization administration, verified patients identity using patient s name and date of birth. Please see Immunization Activity for additional information.     Screening Questionnaire for Adult Immunization    Are you sick today?   No   Do you have allergies to medications, food, a vaccine component or latex?   No   Have you ever had a serious reaction after receiving a vaccination?   No   Do you have a long-term health problem with heart, lung, kidney, or metabolic disease (e.g., diabetes), asthma, a blood disorder, no spleen, complement component deficiency, a cochlear implant, or a spinal fluid leak?  Are you on long-term aspirin therapy?   No   Do you have cancer, leukemia, HIV/AIDS, or any other immune system problem?   No   Do you have a parent, brother, or sister with an immune system problem?   No   In the past 3 months, have you taken medications that affect  your immune system, such as prednisone, other steroids, or anticancer drugs; drugs for the treatment of rheumatoid arthritis, Crohn s disease, or psoriasis; or have you had radiation treatments?   No   Have you had a seizure, or a brain or other nervous system problem?   No   During the past year, have you received a transfusion of blood or blood    products, or been given immune (gamma) globulin or antiviral drug?   No   For women: Are you pregnant or is there a chance you could become       pregnant during the next month?   No   Have you received any vaccinations in the past 4 weeks?   No     Immunization questionnaire answers were all negative.      Patient instructed to remain in clinic for 15 minutes afterwards, and to report any adverse reactions.     Screening performed by Bhumi Jenkins RN on 1/16/2024 at 10:08 AM.

## 2024-03-05 ENCOUNTER — ALLIED HEALTH/NURSE VISIT (OUTPATIENT)
Dept: FAMILY MEDICINE | Facility: CLINIC | Age: 34
End: 2024-03-05
Payer: COMMERCIAL

## 2024-03-05 VITALS — SYSTOLIC BLOOD PRESSURE: 112 MMHG | DIASTOLIC BLOOD PRESSURE: 79 MMHG

## 2024-03-05 DIAGNOSIS — Z01.30 BP CHECK: Primary | ICD-10-CM

## 2024-03-05 PROCEDURE — 99207 PR NO CHARGE NURSE ONLY: CPT

## 2024-03-05 NOTE — PROGRESS NOTES
I met with Cheikh Hess at the request of Dr. Schafer to recheck her blood pressure.  Blood pressure medications on the med list were reviewed with patient.    Patient has taken all medications as per usual regimen: Yes  Patient reports tolerating them without any issues or concerns: Yes    There were no vitals filed for this visit.    Blood pressure was taken, previous encounter was reviewed, recorded blood pressure below 140/90.  Patient was discharged and the note will be sent to the provider for final review.      BP: 112/79      Pt stated she got her implant removed last time and Dr. Schafer put her on contraceptive pills. Pt stated she only took 4 pills and did not like the way it made her feel so she stopped. Pt does not want any contraceptive method at this time as she stated her  ants to try to have another kid.        Joe Coates Cem Say, BSN RN  [facility]

## 2024-04-18 ENCOUNTER — TELEPHONE (OUTPATIENT)
Dept: FAMILY MEDICINE | Facility: CLINIC | Age: 34
End: 2024-04-18
Payer: COMMERCIAL

## 2024-04-18 NOTE — TELEPHONE ENCOUNTER
Patient Quality Outreach    Patient is due for the following:   Hypertension -  Hypertension follow-up visit    Next Steps:   No follow up needed at this time.  Patient has upcoming appointment, these items will be addressed at that time.   Patient was last seen on 1/16/24 with following order:  (I10) Essential hypertension  Comment: not controlled today - recheck with nurse visit after starting OCPs. If not better then, might start medication.  Nurse BP recheck stable on 3/5/24. Next provider follow up is 7/16//24.    Type of outreach:    Chart review performed, no outreach needed.      Questions for provider review:    None           Wilmer Key RN

## 2024-07-15 DIAGNOSIS — R93.1 ELEVATED CORONARY ARTERY CALCIUM SCORE: ICD-10-CM

## 2024-07-15 DIAGNOSIS — I10 ESSENTIAL HYPERTENSION: Primary | ICD-10-CM

## 2024-07-15 DIAGNOSIS — E83.52 SERUM CALCIUM ELEVATED: ICD-10-CM

## 2024-07-16 ENCOUNTER — PATIENT OUTREACH (OUTPATIENT)
Dept: CARE COORDINATION | Facility: CLINIC | Age: 34
End: 2024-07-16

## 2024-07-16 ENCOUNTER — OFFICE VISIT (OUTPATIENT)
Dept: FAMILY MEDICINE | Facility: CLINIC | Age: 34
End: 2024-07-16
Payer: COMMERCIAL

## 2024-07-16 VITALS
DIASTOLIC BLOOD PRESSURE: 83 MMHG | HEIGHT: 60 IN | BODY MASS INDEX: 26.11 KG/M2 | TEMPERATURE: 97.9 F | WEIGHT: 133 LBS | RESPIRATION RATE: 16 BRPM | OXYGEN SATURATION: 96 % | SYSTOLIC BLOOD PRESSURE: 113 MMHG | HEART RATE: 75 BPM

## 2024-07-16 DIAGNOSIS — E83.52 SERUM CALCIUM ELEVATED: ICD-10-CM

## 2024-07-16 DIAGNOSIS — H53.9 CHANGE IN VISION: ICD-10-CM

## 2024-07-16 DIAGNOSIS — H57.9 ITCHY EYES: ICD-10-CM

## 2024-07-16 DIAGNOSIS — R03.0 ELEVATED BP WITHOUT DIAGNOSIS OF HYPERTENSION: ICD-10-CM

## 2024-07-16 DIAGNOSIS — T74.91XA DOMESTIC VIOLENCE OF ADULT, INITIAL ENCOUNTER: Primary | ICD-10-CM

## 2024-07-16 LAB
ALBUMIN SERPL BCG-MCNC: 4.5 G/DL (ref 3.5–5.2)
ALBUMIN UR-MCNC: NEGATIVE MG/DL
ALP SERPL-CCNC: 78 U/L (ref 40–150)
ALT SERPL W P-5'-P-CCNC: 41 U/L (ref 0–50)
ANION GAP SERPL CALCULATED.3IONS-SCNC: 11 MMOL/L (ref 7–15)
APPEARANCE UR: ABNORMAL
AST SERPL W P-5'-P-CCNC: 56 U/L (ref 0–45)
BACTERIA #/AREA URNS HPF: ABNORMAL /HPF
BASOPHILS # BLD AUTO: 0.1 10E3/UL (ref 0–0.2)
BASOPHILS NFR BLD AUTO: 2 %
BILIRUB SERPL-MCNC: 0.4 MG/DL
BILIRUB UR QL STRIP: NEGATIVE
BUN SERPL-MCNC: 16.4 MG/DL (ref 6–20)
CALCIUM SERPL-MCNC: 8.9 MG/DL (ref 8.8–10.4)
CHLORIDE SERPL-SCNC: 99 MMOL/L (ref 98–107)
COLOR UR AUTO: YELLOW
CREAT SERPL-MCNC: 0.91 MG/DL (ref 0.51–0.95)
CRP SERPL-MCNC: <3 MG/L
EGFRCR SERPLBLD CKD-EPI 2021: 85 ML/MIN/1.73M2
EOSINOPHIL # BLD AUTO: 0.6 10E3/UL (ref 0–0.7)
EOSINOPHIL NFR BLD AUTO: 8 %
ERYTHROCYTE [DISTWIDTH] IN BLOOD BY AUTOMATED COUNT: 13 % (ref 10–15)
GLUCOSE SERPL-MCNC: 94 MG/DL (ref 70–99)
GLUCOSE UR STRIP-MCNC: NEGATIVE MG/DL
HCO3 SERPL-SCNC: 24 MMOL/L (ref 22–29)
HCT VFR BLD AUTO: 44.8 % (ref 35–47)
HGB BLD-MCNC: 15.7 G/DL (ref 11.7–15.7)
HGB UR QL STRIP: ABNORMAL
IMM GRANULOCYTES # BLD: 0 10E3/UL
IMM GRANULOCYTES NFR BLD: 0 %
KETONES UR STRIP-MCNC: NEGATIVE MG/DL
LDH SERPL L TO P-CCNC: 167 U/L (ref 0–250)
LEUKOCYTE ESTERASE UR QL STRIP: ABNORMAL
LYMPHOCYTES # BLD AUTO: 3.7 10E3/UL (ref 0.8–5.3)
LYMPHOCYTES NFR BLD AUTO: 48 %
MCH RBC QN AUTO: 30.1 PG (ref 26.5–33)
MCHC RBC AUTO-ENTMCNC: 35 G/DL (ref 31.5–36.5)
MCV RBC AUTO: 86 FL (ref 78–100)
MONOCYTES # BLD AUTO: 0.5 10E3/UL (ref 0–1.3)
MONOCYTES NFR BLD AUTO: 7 %
MUCOUS THREADS #/AREA URNS LPF: PRESENT /LPF
NEUTROPHILS # BLD AUTO: 2.9 10E3/UL (ref 1.6–8.3)
NEUTROPHILS NFR BLD AUTO: 36 %
NITRATE UR QL: NEGATIVE
PH UR STRIP: 5.5 [PH] (ref 5–7)
PLATELET # BLD AUTO: 327 10E3/UL (ref 150–450)
POTASSIUM SERPL-SCNC: 4.1 MMOL/L (ref 3.4–5.3)
PROT SERPL-MCNC: 8 G/DL (ref 6.4–8.3)
RBC # BLD AUTO: 5.21 10E6/UL (ref 3.8–5.2)
RBC #/AREA URNS AUTO: ABNORMAL /HPF
SODIUM SERPL-SCNC: 134 MMOL/L (ref 135–145)
SP GR UR STRIP: >=1.03 (ref 1–1.03)
SQUAMOUS #/AREA URNS AUTO: ABNORMAL /LPF
UROBILINOGEN UR STRIP-ACNC: 0.2 E.U./DL
WBC # BLD AUTO: 7.9 10E3/UL (ref 4–11)
WBC #/AREA URNS AUTO: ABNORMAL /HPF

## 2024-07-16 PROCEDURE — 86140 C-REACTIVE PROTEIN: CPT | Performed by: FAMILY MEDICINE

## 2024-07-16 PROCEDURE — 83615 LACTATE (LD) (LDH) ENZYME: CPT | Performed by: FAMILY MEDICINE

## 2024-07-16 PROCEDURE — 80053 COMPREHEN METABOLIC PANEL: CPT | Performed by: FAMILY MEDICINE

## 2024-07-16 PROCEDURE — 85025 COMPLETE CBC W/AUTO DIFF WBC: CPT | Performed by: FAMILY MEDICINE

## 2024-07-16 PROCEDURE — 99215 OFFICE O/P EST HI 40 MIN: CPT | Performed by: FAMILY MEDICINE

## 2024-07-16 PROCEDURE — 81001 URINALYSIS AUTO W/SCOPE: CPT | Performed by: FAMILY MEDICINE

## 2024-07-16 PROCEDURE — G2211 COMPLEX E/M VISIT ADD ON: HCPCS | Performed by: FAMILY MEDICINE

## 2024-07-16 PROCEDURE — 36415 COLL VENOUS BLD VENIPUNCTURE: CPT | Performed by: FAMILY MEDICINE

## 2024-07-16 RX ORDER — KETOTIFEN FUMARATE 0.35 MG/ML
1 SOLUTION/ DROPS OPHTHALMIC 2 TIMES DAILY
Qty: 5 ML | Refills: 5 | Status: SHIPPED | OUTPATIENT
Start: 2024-07-16

## 2024-07-16 ASSESSMENT — ACTIVITIES OF DAILY LIVING (ADL): DEPENDENT_IADLS:: INDEPENDENT

## 2024-07-16 ASSESSMENT — PATIENT HEALTH QUESTIONNAIRE - PHQ9
SUM OF ALL RESPONSES TO PHQ QUESTIONS 1-9: 12
SUM OF ALL RESPONSES TO PHQ QUESTIONS 1-9: 12
10. IF YOU CHECKED OFF ANY PROBLEMS, HOW DIFFICULT HAVE THESE PROBLEMS MADE IT FOR YOU TO DO YOUR WORK, TAKE CARE OF THINGS AT HOME, OR GET ALONG WITH OTHER PEOPLE: SOMEWHAT DIFFICULT
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
## 2024-07-16 NOTE — LETTER
[facility] CARE COORDINATION  [address]  [place] [address]    July 16, 2024    Cheikh Gill   [address]  [place] [address]      Dear Cheikh Gill,    I am a clinic care coordinator who works with Angie Schafer MD with the [facility]. I wanted to thank you for spending the time to talk with me.  Below is a description of clinic care coordination and how I can further assist you.       The clinic care coordination team is made up of a registered nurse, , financial resource worker and community health worker who understand the health care system. The goal of clinic care coordination is to help you manage your health and improve access to the health care system. Our team works alongside your provider to assist you in determining your health and social needs. We can help you obtain health care and community resources, providing you with necessary information and education. We can work with you through any barriers and develop a care plan that helps coordinate and strengthen the communication between you and your care team.  Our services are voluntary and are offered without charge to you personally.    Please feel free to contact me with any questions or concerns regarding care coordination and what we can offer.      We are focused on providing you with the highest-quality healthcare experience possible.    Sincerely,     Carol Phoenix, [facility]  Social Work Care Cooridinator   [facility]   Phone: [phone]

## 2024-07-16 NOTE — Clinical Note
PERRY Schafer Patient is no longer interested in going in shelter. I will follow up her in couple weeks to make sure everything is okay.   Carol Phoenix, [facility] Social Work Care Coorimelissa  [facility]  Phone: [phone]

## 2024-07-18 NOTE — PROGRESS NOTES
Pc to patient for follow. Patient noted she doing okay. Writer inquire if she called she shelter we discussed. Patient shared she no longer interested in going into shelter. She noted she will let writer know if she decides to go shelter or she will call the shelter directly if needed. Writer inquire if it okay for writer to check with her in few weeks. Patient agreed.    Carol Phoenix [facility]  Social Work [facility]   Phone: [phone]

## 2024-08-01 ENCOUNTER — PATIENT OUTREACH (OUTPATIENT)
Dept: CARE COORDINATION | Facility: CLINIC | Age: 34
End: 2024-08-01
Payer: COMMERCIAL

## 2024-08-01 NOTE — LETTER
Public housing       Instructions  [facility] will begin accepting online applications for multifamily apartments with 0, 1, 2, 3, 4 and 5 bedrooms beginning on March 1, 2023 and continuing until further notice.  All applications received between 8:00 AM (CDT) on March 1 through 11:59 PM (CDT) on March 7, 2023, will be randomly sorted, so there is no advantage to applying on the first day.  Applications received after March 7, 2023 will be sorted by the date and time received.  Applications will be accepted online only.
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
## 2024-09-03 ENCOUNTER — PATIENT OUTREACH (OUTPATIENT)
Dept: CARE COORDINATION | Facility: CLINIC | Age: 34
End: 2024-09-03
Payer: COMMERCIAL

## 2024-09-03 NOTE — LETTER
Main Office    [address] [place] [address] [id number]  [place], [place] [id number] [phone]
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
## 2024-10-03 ENCOUNTER — PATIENT OUTREACH (OUTPATIENT)
Dept: CARE COORDINATION | Facility: CLINIC | Age: 34
End: 2024-10-03
Payer: COMMERCIAL

## 2024-10-03 NOTE — PROGRESS NOTES
Clinic Care Coordination Contact  [facility]/Voicemail    Clinical Data: Care Coordinator Outreach        Left message on patient's voicemail with call back information and requested return call.    Plan:  Care Coordinator will try to reach patient again in 10 business days.    Carol Phoenix [facility]  Social Work Care [facility]   Phone: [phone]

## 2024-10-03 NOTE — LETTER
[facility]  Patient Centered Plan of Care  About Me:        Patient Name:  Cheikh Hess    YOB: 1990  Age:         33 year old   [place] MRN:    [id number] Telephone Information:  Home Phone [phone]   Mobile [phone]       Address:  [address]  [place] [address] Email address:  No e-mail address on record      Emergency Contact(s)    Name Relationship Lgl Grd Work Phone Home Phone Mobile Phone   1. JEFRY,PET PET Spouse   [phone]            Primary language:  Ching     needed? Yes   [employer]:  [phone] op. 1  Other communication barriers:Language barrier    Preferred Method of Communication:     Current living arrangement: I live in a private home with family    Mobility Status/ Medical Equipment: Independent        Health Maintenance  Health Maintenance Reviewed: Up to date      My Access Plan  Medical Emergency 911   Primary Clinic Line [facility] [phone]   24 Hour Appointment Line [phone] or  [phone]) (toll-free)   24 Hour Nurse Line [phone] (toll-free)   Preferred Urgent Care [facility], [phone]     Preferred Hospital [facility]  [phone]     Preferred Pharmacy WRITTEN PRESCRIPTION REQUESTED     Behavioral Health Crisis Line The National Suicide Prevention Lifeline at 1-765.716.1747 or Text/Call 638           My Care Team Members  Patient Care Team         Relationship Specialty Notifications Start End    Angie Schafer MD PCP - General Family Medicine  9/8/22     Phone: [phone] Fax: [phone]         [address] [place] [address]    Angie Schafer MD Assigned PCP   8/17/22     Phone: [phone] Fax: [phone]         [address] [place] [address]    JOSE M    8/18/22     Phone: [phone]         Brittni Phoenix MSW Lead Care Coordinator  Admissions 7/16/24                  My Care Plans  Self Management and Treatment Plan    Care Plan  Care Plan: Safety       Problem: Potential Safety Concern       Goal: Establish appropriate services and supports       Start Date: 7/16/2024    This Visit's Progress: On Hold Recent Progress: 40%    Priority: Medium    Note:     Barriers: income, DV  Strengths: Motivated   Patient expressed understanding of goal: Yes  Action steps to achieve this goal:  1. I will call [facility] or call [facility] at [phone] in next few days   2. I will engage with CC monthly and request additional support as needed                                Care Plan: Housing Instability       Problem: SDOH LACK OF STABLE HOUSING       Goal: Establish Stable Housing       Start Date: 8/1/2024    This Visit's Progress: 40% Recent Progress: 20%    Priority: Medium    Note:     Barriers: income   Strengths: Motivated   Patient expressed understanding of goal: Yes  Action steps to achieve this goal:  1. I will review subsidized housing and place myself on wait list in next month.   2. I will engage with CC monthly and request additional support as needed.                                   Action Plans on File:                       Advance Care Plans/Directives:   Advanced Care Plan/Directives on file:   No    Discussed with patient/caregiver(s): No data recorded           My Medical and Care Information  Problem List   Patient Active Problem List   Diagnosis    Elevated BP without diagnosis of hypertension    Alcoholism (H)    Surveillance of contraceptive implant    Dysthymia      Current Medications and Allergies:  See printed Medication Report.    Care Coordination Start Date: 7/16/2024   Frequency of Care Coordination: monthly, more frequently as needed     Form Last Updated: 10/14/2024

## 2024-10-03 NOTE — LETTER
[facility] CARE COORDINATION  [address] [place] [address] [address]  [place] [address]    October 14, 2024    Cheikh Gill Eh  [address]  [place] [address]      Dear Cheikh Gill,    I have been attempting to reach you since our last contact. I would like to continue to work with you and provide any additional support you may need on achieving your health care related goals. I would appreciate if you would give me a call at [phone] to let me know if you would like to continue working together. I know that there are many things that can affect our ability to communicate and I hope we can continue to work together.    All of us at the [facility] are invested in your health and are here to assist you in meeting your goals.     Sincerely,    Carol Phoenix [facility]  Social Work Care Cooridinator   [facility]   Phone: [phone]

## 2024-10-14 NOTE — PROGRESS NOTES
Clinic Care Coordination Contact  [facility]/Voicemail    Clinical Data: Care Coordinator Outreach        Left message on patient's voicemail with call back information and requested return call.    Plan: Care Coordinator will send unable to contact letter with care coordinator contact information via mail. Care Coordinator will try to reach patient again in 10 business days.    Carol Phoenix [facility]  Social Work Care [facility]   Phone: [phone]

## 2024-10-29 NOTE — PROGRESS NOTES
Clinic Care Coordination Contact  Follow Up Progress Note      Assessment: Pc to patient for follow up. Patient shared she doing okay. She noted she hasn't applied for public housing yet. She noted she will go stop [employer]. Patient also shared there is a worker with [employer] at [phone] who has been working for over a year. Patient was able to clarify is this worker is [employer] worker however patient noted this person comes in weekly and help her with paperwork. Writer encouraged for patient to rely on this worker to help applying for subsided housing. Patient also inquire about labs for a Tdap. 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 Writer recommend for patient to schedule appointment with Lab with the help of [employer] [facility] worker. Patient reported no other needs this time. Writer ask patient if okay for writer to call [place]. Patient agreeable.     Spoke with Melanie Valdes who shared she an  for patient [employer] worker Eran Calvo at [phone]. She noted they visit patient weekly. Writer requested for [employer] worker to help with public housing wait list and also assist with finding a lab for her employment. Melanie conner noted she will follow with [employer] worker as they planning on seeing patient tomorrow.     Care Gaps:    Health Maintenance Due   Topic Date Due    DEPRESSION ACTION PLAN  Never done    YEARLY PREVENTIVE VISIT  11/07/2023    INFLUENZA VACCINE (1) 09/01/2024    COVID-19 Vaccine (3 - 2024-25 season) 09/01/2024       Currently there are no Care Gaps.    Care Plans  Care Plan: Safety       Problem: Potential Safety Concern       Goal: Establish appropriate services and supports       Start Date: 7/16/2024    This Visit's Progress: On Hold Recent Progress: On Hold    Priority: Medium    Note:     Barriers: income, DV  Strengths: Motivated   Patient expressed understanding of goal: Yes  Action steps to achieve this goal:  1. I will call [facility] or call  [facility] at [phone] in next few days   2. I will engage with CC monthly and request additional support as needed                                Care Plan: Housing Instability       Problem: SDOH LACK OF STABLE HOUSING       Goal: Establish Stable Housing       Start Date: 8/1/2024    This Visit's Progress: 40% Recent Progress: 20%    Priority: Medium    Note:     Barriers: income   Strengths: Motivated   Patient expressed understanding of goal: Yes  Action steps to achieve this goal:  1. I will review subsidized housing and place myself on wait list in next month.   2. I will engage with CC monthly and request additional support as needed.                                   Intervention/Education provided during outreach: Pt reports understanding and denies any additional questions or concerns at this times. SW CC engaged in [employer] communication during encounter.      Plan:  Patient will place herself on wait list for subsidized housing with help of her [facility] worker.     Care Coordinator will follow up in monthly     LEONARDA Snyder  Social Work Care Coorimelissa   [facility]   Phone: [phone]

## 2024-10-30 DIAGNOSIS — Z11.1 SCREENING EXAMINATION FOR PULMONARY TUBERCULOSIS: Primary | ICD-10-CM

## 2024-10-31 ENCOUNTER — LAB (OUTPATIENT)
Dept: LAB | Facility: CLINIC | Age: 34
End: 2024-10-31
Payer: COMMERCIAL

## 2024-10-31 DIAGNOSIS — Z11.1 SCREENING EXAMINATION FOR PULMONARY TUBERCULOSIS: ICD-10-CM

## 2024-10-31 PROCEDURE — 86481 TB AG RESPONSE T-CELL SUSP: CPT

## 2024-10-31 PROCEDURE — 36415 COLL VENOUS BLD VENIPUNCTURE: CPT

## 2024-11-01 LAB
QUANTIFERON MITOGEN: 1.74 IU/ML
QUANTIFERON NIL TUBE: 0.06 IU/ML
QUANTIFERON TB1 TUBE: 0.05 IU/ML
QUANTIFERON TB2 TUBE: 0.09

## 2024-11-02 LAB
GAMMA INTERFERON BACKGROUND BLD IA-ACNC: 0.06 IU/ML
M TB IFN-G BLD-IMP: NEGATIVE
M TB IFN-G CD4+ BCKGRND COR BLD-ACNC: 1.68 IU/ML
MITOGEN IGNF BCKGRD COR BLD-ACNC: -0.01 IU/ML
MITOGEN IGNF BCKGRD COR BLD-ACNC: 0.03 IU/ML

## 2024-12-02 ENCOUNTER — PATIENT OUTREACH (OUTPATIENT)
Dept: CARE COORDINATION | Facility: CLINIC | Age: 34
End: 2024-12-02
Payer: COMMERCIAL

## 2024-12-02 NOTE — PROGRESS NOTES
Clinic Care Coordination Contact    Assessment: Care Coordinator contacted patient for 2 month follow up.  Patient has continued to follow the plan of care and assessment is negative for any new needs or concerns.    Enrollment status: Graduated.      Plan: No further outreaches at this time.  Patient will continue to follow the plan of care.  If new needs arise a new Care Coordination referral may be placed.  FYI to PCP    Carol Phoenix [facility]  Social Work Care [facility]   Phone: [phone]

## 2024-12-02 NOTE — LETTER
[facility] CARE COORDINATION  [address]  [place] [address]    December 2, 2024    [place] Jairo   [address]  [place] [address]    Dear Cheikh Gill,  Your Care Team congratulates you on your journey to maintain wellness. This document will help guide you on your journey to maintain a healthy lifestyle.  You can use this to help you overcome any barriers you may encounter.  If you should have any questions or concerns, you can contact the members of your Care Team or contact your Primary Care Clinic for assistance.     Health Maintenance  Health Maintenance Reviewed:      My Access Plan  Medical Emergency 911   Primary Clinic Line [facility] [phone]   24 Hour Appointment Line [phone] or  [phone]) (toll-free)   24 Hour Nurse Line [phone] (toll-free)   Preferred Urgent Care     Preferred Hospital     Preferred Pharmacy WRITTEN PRESCRIPTION REQUESTED     Behavioral Health Crisis Line The National Suicide Prevention Lifeline at 1-798.812.9716 or 911     My Care Team Members  Patient Care Team         Relationship Specialty Notifications Start End    Angie Schafer MD PCP - General Family Medicine  9/8/22     Phone: [phone] Fax: [phone]         [address] [place] [address]    Angie Schafer MD Assigned PCP   8/17/22     Phone: [phone] Fax: [phone]         [address] [place] [address]    JOSE M    8/18/22     Phone: [phone]         Brittni Phoenix MSW Lead Care Coordinator  Admissions 7/16/24 12/2/24    Alok Calvo [facility] worker   10/29/24     Phone: [phone]                   Advance Care Plans/Directives Type:        It has been your Clinic Care Team's pleasure to work with you on accomplishing your goals.    Regards,  Your Clinic Care Team

## 2024-12-22 ENCOUNTER — APPOINTMENT (OUTPATIENT)
Dept: RADIOLOGY | Facility: CLINIC | Age: 34
End: 2024-12-22
Attending: EMERGENCY MEDICINE
Payer: COMMERCIAL

## 2024-12-22 ENCOUNTER — HOSPITAL ENCOUNTER (EMERGENCY)
Facility: CLINIC | Age: 34
Discharge: HOME | End: 2024-12-23
Attending: EMERGENCY MEDICINE
Payer: COMMERCIAL

## 2024-12-22 VITALS
SYSTOLIC BLOOD PRESSURE: 134 MMHG | RESPIRATION RATE: 16 BRPM | OXYGEN SATURATION: 98 % | HEART RATE: 94 BPM | DIASTOLIC BLOOD PRESSURE: 88 MMHG | TEMPERATURE: 98.1 F

## 2024-12-22 DIAGNOSIS — Z59.19: ICD-10-CM

## 2024-12-22 DIAGNOSIS — J10.1 INFLUENZA A: ICD-10-CM

## 2024-12-22 LAB
FLUAV RNA SPEC QL NAA+PROBE: POSITIVE
FLUBV RNA RESP QL NAA+PROBE: NEGATIVE
RSV RNA SPEC NAA+PROBE: NEGATIVE
S PYO DNA THROAT QL NAA+PROBE: NOT DETECTED
SARS-COV-2 RNA RESP QL NAA+PROBE: NEGATIVE

## 2024-12-22 PROCEDURE — 99284 EMERGENCY DEPT VISIT MOD MDM: CPT | Mod: 25

## 2024-12-22 PROCEDURE — 87651 STREP A DNA AMP PROBE: CPT | Performed by: EMERGENCY MEDICINE

## 2024-12-22 PROCEDURE — 87637 SARSCOV2&INF A&B&RSV AMP PRB: CPT | Performed by: EMERGENCY MEDICINE

## 2024-12-22 PROCEDURE — 250N000012 HC RX MED GY IP 250 OP 636 PS 637: Performed by: EMERGENCY MEDICINE

## 2024-12-22 PROCEDURE — 250N000013 HC RX MED GY IP 250 OP 250 PS 637: Performed by: EMERGENCY MEDICINE

## 2024-12-22 PROCEDURE — 71046 X-RAY EXAM CHEST 2 VIEWS: CPT

## 2024-12-22 RX ORDER — IBUPROFEN 800 MG/1
800 TABLET, FILM COATED ORAL ONCE
Status: COMPLETED | OUTPATIENT
Start: 2024-12-22 | End: 2024-12-22

## 2024-12-22 RX ORDER — ONDANSETRON 4 MG/1
4 TABLET, ORALLY DISINTEGRATING ORAL EVERY 8 HOURS PRN
Qty: 20 TABLET | Refills: 0 | Status: SHIPPED | OUTPATIENT
Start: 2024-12-22

## 2024-12-22 RX ORDER — ACETAMINOPHEN 325 MG/1
975 TABLET ORAL ONCE
Status: COMPLETED | OUTPATIENT
Start: 2024-12-22 | End: 2024-12-22

## 2024-12-22 RX ADMIN — ACETAMINOPHEN 975 MG: 325 TABLET ORAL at 22:49

## 2024-12-22 RX ADMIN — DEXAMETHASONE 10 MG: 2 TABLET ORAL at 22:49

## 2024-12-22 RX ADMIN — IBUPROFEN 800 MG: 800 TABLET, FILM COATED ORAL at 22:49

## 2024-12-22 SDOH — ECONOMIC STABILITY - HOUSING INSECURITY: OTHER INADEQUATE HOUSING: Z59.19

## 2024-12-22 ASSESSMENT — COLUMBIA-SUICIDE SEVERITY RATING SCALE - C-SSRS
1. IN THE PAST MONTH, HAVE YOU WISHED YOU WERE DEAD OR WISHED YOU COULD GO TO SLEEP AND NOT WAKE UP?: NO
2. HAVE YOU ACTUALLY HAD ANY THOUGHTS OF KILLING YOURSELF IN THE PAST MONTH?: NO
6. HAVE YOU EVER DONE ANYTHING, STARTED TO DO ANYTHING, OR PREPARED TO DO ANYTHING TO END YOUR LIFE?: NO

## 2024-12-22 ASSESSMENT — ACTIVITIES OF DAILY LIVING (ADL)
ADLS_ACUITY_SCORE: 41
ADLS_ACUITY_SCORE: 41

## 2024-12-23 NOTE — DISCHARGE INSTRUCTIONS
You have influenza A. This is a virus that causes things like cough, sore throat, runny nose, body aches, fevers. Antibiotics do not help with this infection.    As needed for pain control at home, take:  - over-the-counter ibuprofen 800mg by mouth every 8 hours (max dose 2400mg in 24 hours)  - over-the-counter acetaminophen 1g by mouth every 6 hours (max dose 4g in 24 hours)    Use the prescribed Zofran for any nausea or vomiting.    Drink plenty of fluids to stay hydrated.    I made a referral to social work for you; they will be calling you in the next couple days.    Follow up with your Primary Care provider in 2 days for a recheck.    Return to the Emergency Department for any difficulty breathing, persistent vomiting, or any other concerns.
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
## 2024-12-24 ENCOUNTER — PATIENT OUTREACH (OUTPATIENT)
Dept: CARE COORDINATION | Facility: CLINIC | Age: 34
End: 2024-12-24
Payer: COMMERCIAL

## 2024-12-24 DIAGNOSIS — Z59.19: Primary | ICD-10-CM

## 2024-12-24 SDOH — ECONOMIC STABILITY - HOUSING INSECURITY: OTHER INADEQUATE HOUSING: Z59.19
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
## 2025-01-06 ENCOUNTER — OFFICE VISIT (OUTPATIENT)
Dept: FAMILY MEDICINE | Facility: CLINIC | Age: 35
End: 2025-01-06
Payer: COMMERCIAL

## 2025-01-06 ENCOUNTER — ANCILLARY PROCEDURE (OUTPATIENT)
Dept: GENERAL RADIOLOGY | Facility: CLINIC | Age: 35
End: 2025-01-06
Attending: FAMILY MEDICINE
Payer: COMMERCIAL

## 2025-01-06 VITALS
BODY MASS INDEX: 23.88 KG/M2 | OXYGEN SATURATION: 99 % | HEIGHT: 61 IN | RESPIRATION RATE: 12 BRPM | DIASTOLIC BLOOD PRESSURE: 92 MMHG | TEMPERATURE: 98.6 F | HEART RATE: 95 BPM | SYSTOLIC BLOOD PRESSURE: 122 MMHG | WEIGHT: 126.5 LBS

## 2025-01-06 DIAGNOSIS — R03.0 ELEVATED BLOOD PRESSURE READING WITHOUT DIAGNOSIS OF HYPERTENSION: ICD-10-CM

## 2025-01-06 DIAGNOSIS — R05.1 ACUTE COUGH: Primary | ICD-10-CM

## 2025-01-06 DIAGNOSIS — R05.1 ACUTE COUGH: ICD-10-CM

## 2025-01-06 PROCEDURE — 99214 OFFICE O/P EST MOD 30 MIN: CPT | Performed by: FAMILY MEDICINE

## 2025-01-06 PROCEDURE — T1013 SIGN LANG/ORAL INTERPRETER: HCPCS | Mod: U4

## 2025-01-06 PROCEDURE — 71046 X-RAY EXAM CHEST 2 VIEWS: CPT | Mod: TC | Performed by: RADIOLOGY

## 2025-01-06 PROCEDURE — T1013 SIGN LANG/ORAL INTERPRETER: HCPCS | Mod: U3

## 2025-01-06 ASSESSMENT — PATIENT HEALTH QUESTIONNAIRE - PHQ9
10. IF YOU CHECKED OFF ANY PROBLEMS, HOW DIFFICULT HAVE THESE PROBLEMS MADE IT FOR YOU TO DO YOUR WORK, TAKE CARE OF THINGS AT HOME, OR GET ALONG WITH OTHER PEOPLE: SOMEWHAT DIFFICULT
SUM OF ALL RESPONSES TO PHQ QUESTIONS 1-9: 2
SUM OF ALL RESPONSES TO PHQ QUESTIONS 1-9: 2

## 2025-01-06 NOTE — PROGRESS NOTES
"  Assessment & Plan     Acute cough  Influenza A over 2 weeks ago, with overall improvement but ongoing cough, and also reports subjective fevers vs body aches (terms seem to be used interchangeably, so not clear she is having true fevers). CXR today and if clear, recommend continuing symptomatic measures, and would expect cough to resolve in the next 1-2 weeks. Follow up right away if worsening, if any difficulty breathing, SOB, or different/new high fever.   Call w results - patient prefers not to wait after CXR  - XR Chest 2 Views    Elevated blood pressure reading without diagnosis of hypertension  BP not typically elevated, but has been borderline on several visits since 2023, perhaps correlating with weight gain. Current elevation could be due to OTC cough suppressant (patient uncertain of name). Recommend she return within 1-2 months for BP recheck.     Call w results number verified  MED REC REQUIRED  Post Medication Reconciliation Status:  Discharge medications reconciled, continue medications without change        Subjective   Cheikh Gill is a 34 year old, presenting for the following health issues:  Hospital F/U (Coughing still but has been better since Ed discharge. It gets worse at night and make chest hurt.)        1/6/2025     9:17 AM   Additional Questions   Roomed by paw p   Accompanied by spouse     HPI     ER 12/22 for cough, subjective fever, body aches - found to have influenza A. CXR was clear. Given rx zofran for nausea at discharge.     Cough has improved somewhat, but still present and is worse at night. Sometimes productive of clear or some yellow sputum. No SOB. Taking OTC cough med (American medication from drug store - liquid, uncertain of name), helps. Still feeling subjective fevers at night, although uses \"fevers\" interchangeably with \"body aches\" so not clear she is having true fevers.     Last took cough med last night. Denies EtOH use. Chews betel nut.     Appetite fine.                " "  Objective    BP (!) 122/92   Pulse 95   Temp 98.6  F (37  C) (Oral)   Resp 12   Ht 1.54 m (5' 0.63\")   Wt 57.4 kg (126 lb 8 oz)   LMP  (LMP Unknown)   SpO2 99%   BMI 24.19 kg/m    Body mass index is 24.19 kg/m .  Physical Exam   GENERAL: alert and no distress  EYES: Eyes grossly normal to inspection, PERRL and conjunctivae and sclerae normal  HENT: ear canals and TM's normal, nose and mouth without ulcers or lesions (betel nut staining)  NECK: no adenopathy, no asymmetry, masses, or scars  RESP: lungs clear to auscultation - no rales, rhonchi or wheezes  CV: regular rate and rhythm, normal S1 S2, no S3 or S4, no murmur, click or rub, no peripheral edema  MS: no gross musculoskeletal defects noted, no edema            Signed Electronically by: Winsome Beckford MD    Answers submitted by the patient for this visit:  Patient Health Questionnaire (Submitted on 1/6/2025)  If you checked off any problems, how difficult have these problems made it for you to do your work, take care of things at home, or get along with other people?: Somewhat difficult  PHQ9 TOTAL SCORE: 2    "

## 2025-01-07 ENCOUNTER — TELEPHONE (OUTPATIENT)
Dept: FAMILY MEDICINE | Facility: CLINIC | Age: 35
End: 2025-01-07
Payer: COMMERCIAL

## 2025-01-07 NOTE — TELEPHONE ENCOUNTER
Relay MD messages from below, pt verbalizes understanding and has no questions. Thanks.      Winsome Beckford MD  [facility]  Please call and let her know that chest xray was normal - no pneumonia.

## 2025-02-10 ENCOUNTER — NURSE TRIAGE (OUTPATIENT)
Dept: FAMILY MEDICINE | Facility: CLINIC | Age: 35
End: 2025-02-10
Payer: COMMERCIAL

## 2025-02-10 NOTE — TELEPHONE ENCOUNTER
"S-(situation): Patient and spouse (translating) calling regarding cough.    B-(background): Reports cough started a couple days ago. Also noted to have insomnia, sore throat, fatigue, SOB. Denies any known sick contacts. SOB is described as mild. Notes some blood in sputum but  unable to elaborate on amount noted.     A-(assessment): c/o mild SOB, cough, insomnia, fatigue. Denies fever, chest pain, head.     R-(recommendations): RN advised appointment today. No appointments available with PCP - advised patient be seen in UC (ED if symptoms worsen).  expressed understanding and will take patient to  now.       Reason for Disposition   MILD difficulty breathing (e.g., minimal/no SOB at rest, SOB with walking, pulse <100) and still present when not coughing    Answer Assessment - Initial Assessment Questions  1. ONSET: \"When did the cough begin?\"       A couple days ago  2. SEVERITY: \"How bad is the cough today?\"       Not bad  3. SPUTUM: \"Describe the color of your sputum\" (e.g., none, dry cough; clear, white, yellow, green)      Occasionally coughing up blood  4. HEMOPTYSIS: \"Are you coughing up any blood?\" If Yes, ask: \"How much?\" (e.g., flecks, streaks, tablespoons, etc.)      Yes - unsure of amount.   5. DIFFICULTY BREATHING: \"Are you having difficulty breathing?\" If Yes, ask: \"How bad is it?\" (e.g., mild, moderate, severe)       Yes - at night time.   6. FEVER: \"Do you have a fever?\" If Yes, ask: \"What is your temperature, how was it measured, and when did it start?\"      none  7. CARDIAC HISTORY: \"Do you have any history of heart disease?\" (e.g., heart attack, congestive heart failure)       HTN  8. LUNG HISTORY: \"Do you have any history of lung disease?\"  (e.g., pulmonary embolus, asthma, emphysema)      none  9. PE RISK FACTORS: \"Do you have a history of blood clots?\" (or: recent major surgery, recent prolonged travel, bedridden)      none  10. OTHER SYMPTOMS: \"Do you have any other symptoms?\" " "(e.g., runny nose, wheezing, chest pain)        Sore throat, insomnia, fatigue, SOB  11. PREGNANCY: \"Is there any chance you are pregnant?\" \"When was your last menstrual period?\"        none  12. TRAVEL: \"Have you traveled out of the country in the last month?\" (e.g., travel history, exposures)    Protocols used: Cough-A-OH    "

## 2025-04-15 ENCOUNTER — TELEPHONE (OUTPATIENT)
Dept: FAMILY MEDICINE | Facility: CLINIC | Age: 35
End: 2025-04-15
Payer: COMMERCIAL

## 2025-04-15 NOTE — TELEPHONE ENCOUNTER
Please call this mom and schedule her for an appt with Dr. Schafer (PCP) as soon as an appointment can fit (Wednesday afternoons should be opening since there are no new refugees).    thanks

## 2025-04-22 NOTE — TELEPHONE ENCOUNTER
Next Appointment  4/30 OFFICE VISIT  9:00 AM (in 8 days)  [facility]    Scheduled appt with pt.

## 2025-04-28 ENCOUNTER — TELEPHONE (OUTPATIENT)
Dept: FAMILY MEDICINE | Facility: CLINIC | Age: 35
End: 2025-04-28
Payer: COMMERCIAL

## 2025-04-28 NOTE — TELEPHONE ENCOUNTER
Patient Quality Outreach    Patient is due for the following:   Depression  -  DAP  Physical Preventive Adult Physical      Topic Date Due    Flu Vaccine (1) 09/01/2024    COVID-19 Vaccine (3 - 2024-25 season) 09/01/2024     Annual Review of HM Orders     Action(s) Taken:   Patient has upcoming appointment, these items will be addressed at that time.    Type of outreach:    Chart review performed, no outreach needed.    Questions for provider review:    None         Ralf Camarillo MA  Chart routed to None.

## 2025-04-30 ENCOUNTER — OFFICE VISIT (OUTPATIENT)
Dept: FAMILY MEDICINE | Facility: CLINIC | Age: 35
End: 2025-04-30
Payer: COMMERCIAL

## 2025-04-30 VITALS
RESPIRATION RATE: 12 BRPM | WEIGHT: 125.5 LBS | DIASTOLIC BLOOD PRESSURE: 98 MMHG | SYSTOLIC BLOOD PRESSURE: 130 MMHG | HEIGHT: 61 IN | OXYGEN SATURATION: 99 % | TEMPERATURE: 98 F | HEART RATE: 84 BPM | BODY MASS INDEX: 23.7 KG/M2

## 2025-04-30 DIAGNOSIS — R03.0 ELEVATED BP WITHOUT DIAGNOSIS OF HYPERTENSION: ICD-10-CM

## 2025-04-30 DIAGNOSIS — T74.91XD DOMESTIC VIOLENCE OF ADULT, SUBSEQUENT ENCOUNTER: Primary | ICD-10-CM

## 2025-04-30 DIAGNOSIS — N94.6 MODERATE CRAMPS WITH MENSES: ICD-10-CM

## 2025-04-30 DIAGNOSIS — Z63.8 EXPOSURE OF CHILD TO DOMESTIC VIOLENCE: ICD-10-CM

## 2025-04-30 DIAGNOSIS — Z00.00 PREVENTATIVE HEALTH CARE: ICD-10-CM

## 2025-04-30 PROBLEM — F10.20 ALCOHOLISM (H): Status: RESOLVED | Noted: 2022-09-07 | Resolved: 2025-04-30

## 2025-04-30 PROBLEM — Z30.40 SURVEILLANCE OF CONTRACEPTIVE IMPLANT: Status: RESOLVED | Noted: 2022-09-07 | Resolved: 2025-04-30

## 2025-04-30 PROCEDURE — 3080F DIAST BP >= 90 MM HG: CPT | Performed by: FAMILY MEDICINE

## 2025-04-30 PROCEDURE — 3075F SYST BP GE 130 - 139MM HG: CPT | Performed by: FAMILY MEDICINE

## 2025-04-30 PROCEDURE — T1013 SIGN LANG/ORAL INTERPRETER: HCPCS

## 2025-04-30 PROCEDURE — 99215 OFFICE O/P EST HI 40 MIN: CPT | Performed by: FAMILY MEDICINE

## 2025-04-30 PROCEDURE — 99417 PROLNG OP E/M EACH 15 MIN: CPT | Performed by: FAMILY MEDICINE

## 2025-04-30 RX ORDER — ACETAMINOPHEN 500 MG
500-1000 TABLET ORAL EVERY 6 HOURS PRN
Qty: 50 TABLET | Refills: 3 | Status: SHIPPED | OUTPATIENT
Start: 2025-04-30

## 2025-04-30 SDOH — SOCIAL STABILITY - SOCIAL INSECURITY: OTHER SPECIFIED PROBLEMS RELATED TO PRIMARY SUPPORT GROUP: Z63.8
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
## 2025-05-05 ENCOUNTER — PATIENT OUTREACH (OUTPATIENT)
Dept: CARE COORDINATION | Facility: CLINIC | Age: 35
End: 2025-05-05
Payer: COMMERCIAL

## 2025-05-12 ENCOUNTER — TELEPHONE (OUTPATIENT)
Dept: FAMILY MEDICINE | Facility: CLINIC | Age: 35
End: 2025-05-12
Payer: COMMERCIAL

## 2025-05-12 DIAGNOSIS — T74.91XD DOMESTIC VIOLENCE OF ADULT, SUBSEQUENT ENCOUNTER: Primary | ICD-10-CM
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
## 2025-05-20 ENCOUNTER — PATIENT OUTREACH (OUTPATIENT)
Dept: CARE COORDINATION | Facility: CLINIC | Age: 35
End: 2025-05-20
Payer: COMMERCIAL

## 2025-05-20 NOTE — CONFIDENTIAL NOTE
CCSW attempted outreach x2. There is a beep when answered but nothing else. Unsure if it safe to leave a voice message. CCSW will attempt outreach one last time- in 7-10 days.       AMEENA Pool, [facility]  Social Work Care Coordinator

## 2025-05-30 PROBLEM — I10 ESSENTIAL HYPERTENSION: Status: ACTIVE | Noted: 2022-11-07

## 2025-06-02 ENCOUNTER — PATIENT OUTREACH (OUTPATIENT)
Dept: CARE COORDINATION | Facility: CLINIC | Age: 35
End: 2025-06-02
Payer: COMMERCIAL
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
## 2025-06-03 ENCOUNTER — RESULTS FOLLOW-UP (OUTPATIENT)
Dept: FAMILY MEDICINE | Facility: CLINIC | Age: 35
End: 2025-06-03
Payer: COMMERCIAL

## 2025-06-03 NOTE — TELEPHONE ENCOUNTER
Result Note  Please call [facility] and let them know the results below:      Urine does not show infection.     Kidney and liver appear to not have issues     Complete blood count is normal meaning no medical concerns for blood loss     Pregnancy test was negative.     For now I think its a good idea to continue with our plan for her to proceed with pelvic ultrasound.     Thank you,     RAMSEY Leonard         Called patient with  [facility] ID: [id number] but patient was at her son's appointment.  RN assisted to find the address and suite number per request.  Will call later after her appointment.    Wilmer Key RN   [facility]

## 2025-06-04 NOTE — TELEPHONE ENCOUNTER
Called patient with  assistance of [facility] ID: [id number] for test result but no answer.  Message left on vm request a return call.  Clinic number provided.    Wilmer Key RN   [facility]

## 2025-06-05 NOTE — TELEPHONE ENCOUNTER
"Writer attempt #3 to call patient with the help of an [facility] \"Ching\"  regarding clinician's message below. Clinician's message relayed to patient. Patient will wait to schedule the Pelvic Ultrasound.    Patient verbalizes understanding, agrees with plan and has no further questions.    CANDY SosaN, RN, PHN   [facility]    "

## 2025-07-17 ENCOUNTER — NURSE TRIAGE (OUTPATIENT)
Dept: FAMILY MEDICINE | Facility: CLINIC | Age: 35
End: 2025-07-17
Payer: COMMERCIAL

## 2025-07-17 NOTE — TELEPHONE ENCOUNTER
Nurse Triage SBAR    Is this a 2nd Level Triage? NO    Situation: Patient called and reported that she's been having some dizziness.     Background: Dizziness began 3 days ago. Patient reported recently been prescribed birth control pills due to irregular menstrual bleeding. Patient thinks it's the birth control pill that's making her dizzy as she's had similar symptoms in the past while on birth control pills. No currently pregnant per patient.      Assessment: Feeling dizzy, lightheadedness, feeling faint when standing. Patient is still able to walk but minimally, only short distances as she feels she might fall if she keeps walking further distances. Still currently feeling dizzy and room is spinning while on phone with RN. Denies all other symptoms.     Protocol Recommended Disposition: Go To Office Now    Recommendation: Assisted in scheduling an appointment for patient today but no availabilities. Instructed patient to go to nearest [facility]. Patient verbalizes she will go to nearest [facility].         Does the patient meet one of the following criteria for ADS visit consideration? 16+ years old, with an [facility] PCP     TIP  Providers, please consider if this condition is appropriate for management at one of our Acute and Diagnostic Services sites.     If patient is a good candidate, please use dotphrase <dot>triageresponse and select Refer to ADS to document.    Reason for Disposition   Lightheadedness (dizziness) present now, after 2 hours of rest and fluids    Additional Information   Negative: SEVERE difficulty breathing (e.g., struggling for each breath, speaks in single words)   Negative: Shock suspected (e.g., cold/pale/clammy skin, too weak to stand, low BP, rapid pulse)   Negative: Difficult to awaken or acting confused (e.g., disoriented, slurred speech)   Negative: Fainted, and still feels dizzy afterwards   Negative: Overdose (accidental or intentional) of medications   Negative: New neurologic deficit that  is present now: * Weakness of the face, arm, or leg on one side of the body * Numbness of the face, arm, or leg on one side of the body * Loss of speech or garbled speech   Negative: Heart beating < 50 beats per minute OR > 140 beats per minute   Negative: Sounds like a life-threatening emergency to the triager   Negative: Chest pain   Negative: Rectal bleeding, bloody stool, or tarry-black stool   Negative: Vomiting is main symptom   Negative: Diarrhea is main symptom   Negative: Headache is main symptom   Negative: Heat exhaustion suspected (i.e., dehydration from heat exposure)   Negative: Patient states that they are having an anxiety or panic attack   Negative: Dizziness from low blood sugar (i.e., < 60 mg/dl or 3.5 mmol/l)   Negative: SEVERE dizziness (e.g., unable to stand, requires support to walk, feels like passing out now)   Negative: SEVERE headache or neck pain   Negative: Spinning or tilting sensation (vertigo) present now and one or more stroke risk factors (i.e., hypertension, diabetes mellitus, prior stroke/TIA, heart attack, age over 60) (Exception: Prior physician evaluation for this AND no different/worse than usual.)   Negative: Neurologic deficit that was brief (now gone), ANY of the following:* Weakness of the face, arm, or leg on one side of the body* Numbness of the face, arm, or leg on one side of the body* Loss of speech or garbled speech   Negative: Loss of vision or double vision  (Exception: Similar to previous migraines.)   Negative: Extra heartbeats, irregular heart beating, or heart is beating very fast (i.e., 'palpitations')   Negative: Difficulty breathing   Negative: Drinking very little and dehydration suspected (e.g., no urine > 12 hours, very dry mouth, very lightheaded)   Negative: Follows bleeding (e.g., stomach, rectum, vagina)  (Exception: Became dizzy from sight of small amount blood.)   Negative: Patient sounds very sick or weak to the triager    Answer Assessment -  "Initial Assessment Questions  1. DESCRIPTION: \"Describe your dizziness.\"      Dizziness like a headache   2. LIGHTHEADED: \"Do you feel lightheaded?\" (e.g., somewhat faint, woozy, weak upon standing)      Yes, feels somewhat fainting, woozy, and going to fall when standing  3. VERTIGO: \"Do you feel like either you or the room is spinning or tilting?\" (i.e. vertigo)      Yes room feels like spinning   4. SEVERITY: \"How bad is it?\"  \"Do you feel like you are going to faint?\" \"Can you stand and walk?\"    - MILD: Feels slightly dizzy, but walking normally.    - MODERATE: Feels unsteady when walking, but not falling; interferes with normal activities (e.g., school, work).    - SEVERE: Unable to walk without falling, or requires assistance to walk without falling; feels like passing out now.       Moderate, feels like unable to keep walking, can only walk for a little bit   5. ONSET:  \"When did the dizziness begin?\"      3 days ago   6. AGGRAVATING FACTORS: \"Does anything make it worse?\" (e.g., standing, change in head position)      Standing   7. HEART RATE: \"Can you tell me your heart rate?\" \"How many beats in 15 seconds?\"  (Note: not all patients can do this)        NA   8. CAUSE: \"What do you think is causing the dizziness?\"      After taking her medications the birth control pills   9. RECURRENT SYMPTOM: \"Have you had dizziness before?\" If Yes, ask: \"When was the last time?\" \"What happened that time?\"      Yes, in the past similar to this while she was on birth control pill   10. OTHER SYMPTOMS: \"Do you have any other symptoms?\" (e.g., fever, chest pain, vomiting, diarrhea, bleeding)        No   11. PREGNANCY: \"Is there any chance you are pregnant?\" \"When was your last menstrual period?\"        No    Protocols used: Dizziness-A-OH      Bhumi Jenkins MSN, RN   [facility]     "